# Patient Record
(demographics unavailable — no encounter records)

---

## 2021-05-08 NOTE — EDM.PDOC
ED HPI GENERAL MEDICAL PROBLEM





- General


Stated Complaint: SHOULDER PAIN


Time Seen by Provider: 05/08/21 00:05


Source of Information: Reports: Patient


History Limitations: Reports: No Limitations





- History of Present Illness


INITIAL COMMENTS - FREE TEXT/NARRATIVE: 





c/o L shoulder pain





pain in L shoulder today that is throbbing





has a desk job, worked today





after work had helped his father put a canopy over his boat hoist, had put a 

mower on a trailer and had cinched it down





pain is over L bicep tendon





also has a mass that he noted in his L mid forearm





no known injury





no meds, did take ibuprofen 800 mg 3h PTA





says it is too painful to lift arm


Treatments PTA: Reports: NSAIDS


  ** L shoulder


Pain Score (Numeric/FACES): 8





- Related Data


                                    Allergies











Allergy/AdvReac Type Severity Reaction Status Date / Time


 


No Known Allergies Allergy   Verified 05/08/21 00:25











Home Meds: 


                                    Home Meds





Indomethacin [Indocin] 50 mg PO TID PRN #30 cap 11/03/19 [Rx]


Lisinopril/Hydrochlorothiazide [Lisinopril-Hctz 10-12.5 mg Tab] 1 each PO DAILY 

05/08/21 [History]


allopurinoL [Zyloprim] 100 mg PO DAILY 05/08/21 [History]


amLODIPine [Norvasc] 5 mg PO DAILY 05/08/21 [History]


atorvaSTATin Calcium [Lipitor] 20 mg PO BEDTIME 05/08/21 [History]


metFORMIN [Glucophage] 500 mg PO BIDMEALS 05/08/21 [History]


predniSONE [Prednisone] 20 mg PO DAILY #5 tablet 05/08/21 [Rx]











Past Medical History


Cardiovascular History: Reports: High Cholesterol, Hypertension


Respiratory History: Reports: Sleep Apnea


Other Respiratory History: uses CPAP


Musculoskeletal History: Reports: Fracture, Gout


Other Musculoskeletal History: fx L thumb


Endocrine/Metabolic History: Reports: Diabetes, Type II, Obesity/BMI 30+





- Infectious Disease History


Infectious Disease History: Reports: Chicken Pox





- Past Surgical History


Musculoskeletal Surgical History: Reports: ORIF


Other Musculoskeletal Surgeries/Procedures:: L thumb pinning





Social & Family History





- Family History


Family Medical History: No Pertinent Family History





- Tobacco Use


Tobacco Use Status *Q: Current Every Day Tobacco User


Years of Tobacco use: 10


Packs/Tins Daily: 0.8





- Caffeine Use


Caffeine Use: Reports: Soda





- Recreational Drug Use


Recreational Drug Use: No





ED ROS GENERAL





- Review of Systems


Review Of Systems: See Below


Constitutional: Reports: No Symptoms


HEENT: Reports: No Symptoms


Respiratory: Reports: No Symptoms


Cardiovascular: Reports: No Symptoms


Endocrine: Reports: No Symptoms


GI/Abdominal: Reports: No Symptoms


: Reports: No Symptoms


Musculoskeletal: Reports: Shoulder Pain, Muscle Pain


Skin: Reports: No Symptoms


Neurological: Reports: No Symptoms


Psychiatric: Reports: No Symptoms


Hematologic/Lymphatic: Reports: No Symptoms


Immunologic: Reports: No Symptoms





ED EXAM, GENERAL





- Physical Exam


Exam: See Below


Exam Limited By: No Limitations


General Appearance: Alert, WD/WN, No Apparent Distress


Nose: Normal Inspection


Throat/Mouth: Normal Inspection


Head: Atraumatic


Neck: Normal Inspection, Supple, Non-Tender.  No: Lymphadenopathy (R), 

Lymphadenopathy (L)


Respiratory/Chest: No Respiratory Distress, Lungs Clear, Normal Breath Sounds, 

No Accessory Muscle Use


Cardiovascular: Regular Rate, Rhythm, No Edema, No Murmur


GI/Abdominal: Soft, Non-Tender, No Distention


Back Exam: Normal Inspection, Full Range of Motion


Extremities: Other (takes off sweatshirt with R arm, leaves L arm at site, does 

allow passive ROM to 90 degrees of flex and abduction altho winces with pain, 1+

 tender over biceps tender)


Neurological: Alert, Oriented, CN II-XII Intact, Normal Cognition, No 

Motor/Sensory Deficits, Other (NT over L AC joint, mild tender at anterior L GH 

joint line, L humerus NT, questionable subc density of 10 x 5 x 5 cm over L 

biceps that may be slightly tender, not discrete)


Psychiatric: Normal Affect, Normal Mood


Skin Exam: Warm, Dry, Intact, Normal Color, No Rash


Lymphatic: No Adenopathy





Course





- Vital Signs


Last Recorded V/S: 


                                Last Vital Signs











Temp  36.5 C   05/08/21 00:20


 


Pulse  89   05/08/21 02:18


 


Resp  18   05/08/21 02:18


 


BP  128/88   05/08/21 02:18


 


Pulse Ox  97   05/08/21 02:18














- Orders/Labs/Meds


Orders: 


                               Active Orders 24 hr











 Category Date Time Status


 


 Humerus Lt [CR] Stat Exams  05/08/21 00:15 Taken


 


 Shoulder Comp Lt [CR] Stat Exams  05/08/21 00:15 Taken











Labs: 


                                Laboratory Tests











  05/08/21 05/08/21 05/08/21 Range/Units





  00:25 00:25 00:25 


 


WBC   9.6   (3.2-10.1)  x10-3/uL


 


RBC   5.32   (3.90-5.90)  x10(6)uL


 


Hgb   15.5   (12.9-17.7)  g/dL


 


Hct   45.7   (38.3-50.1)  %


 


MCV   85.8   (80.8-98.7)  fL


 


MCH   29.1   (27.0-33.3)  pg


 


MCHC   33.9   (28.7-35.3)  g/dL


 


RDW   13.0   (12.4-15.0)  %


 


Plt Count   241   (117-477)  x10(3)uL


 


MPV   7.8   (6.7-11.0)  fL


 


Neut % (Auto)   55.0   (40.3-71.8)  %


 


Lymph % (Auto)   29.8   (15.8-45.3)  %


 


Mono % (Auto)   8.9   (5.5-15.2)  %


 


Eos % (Auto)   5.1   (0.1-6.8)  %


 


Baso % (Auto)   1.2   (0.3-3.8)  %


 


Neut # (Auto)   5.3   (1.7-6.9)  x10-3/uL


 


Lymph # (Auto)   2.9   (0.5-4.5)  x10-3/uL


 


Mono # (Auto)   0.9   (0.0-1.2)  x10-3/uL


 


Eos # (Auto)   0.5   (0.0-0.6)  x10-3/uL


 


Baso # (Auto)   0.1   (0.0-0.3)  x10-3/uL


 


Sodium    139  (135-145)  mmol/L


 


Potassium    3.8  (3.5-5.3)  mmol/L


 


Chloride    102  (100-110)  mmol/L


 


Carbon Dioxide    23  (21-32)  mmol/L


 


BUN    19 H  (7-18)  mg/dL


 


Creatinine    1.0  (0.70-1.30)  mg/dL


 


Est Cr Clr Drug Dosing    102.49  mL/min


 


Estimated GFR (MDRD)    > 60  (>60)  


 


BUN/Creatinine Ratio    19.0  (9-20)  


 


Glucose    147 H  ()  mg/dL


 


Uric Acid  6.0    (2.6-6.0)  mg/dL


 


Calcium    8.7  (8.6-10.2)  mg/dL


 


Total Bilirubin    0.4  (0.1-1.3)  mg/dL


 


AST    21  (5-25)  IU/L


 


ALT    49 H  (12-36)  U/L


 


Alkaline Phosphatase    93  ()  IU/L


 


Creatine Kinase    180 H  ()  IU/L


 


C-Reactive Protein     (0.5-0.9)  mg/dL


 


Total Protein    7.2  (6.0-8.0)  g/dL


 


Albumin    3.2 L  (3.5-5.2)  g/dL


 


Globulin    4.0  g/dL


 


Albumin/Globulin Ratio    0.8  


 


Urine Color     (YELLOW)  


 


Urine Appearance     (CLEAR)  


 


Urine pH     (5.0-6.5)  


 


Ur Specific Gravity     (1.010-1.025)  


 


Urine Protein     (NEGATIVE)  mg/dL


 


Urine Glucose (UA)     (NORMAL)  mg/dL


 


Urine Ketones     (NEGATIVE)  mg/dL


 


Urine Occult Blood     (NEGATIVE)  


 


Urine Nitrite     (NEGATIVE)  


 


Urine Bilirubin     (NEGATIVE)  


 


Urine Urobilinogen     (NEGATIVE)  mg/dL


 


Ur Leukocyte Esterase     (NEGATIVE)  


 


Urine RBC     (0-5)  


 


Urine WBC     (0-5)  


 


Ur Squamous Epith Cells     (NS,R,O)  


 


Urine Bacteria     (NS)  


 


Coarse Granular Casts     (NS)  














  05/08/21 05/08/21 Range/Units





  00:25 01:30 


 


WBC    (3.2-10.1)  x10-3/uL


 


RBC    (3.90-5.90)  x10(6)uL


 


Hgb    (12.9-17.7)  g/dL


 


Hct    (38.3-50.1)  %


 


MCV    (80.8-98.7)  fL


 


MCH    (27.0-33.3)  pg


 


MCHC    (28.7-35.3)  g/dL


 


RDW    (12.4-15.0)  %


 


Plt Count    (117-477)  x10(3)uL


 


MPV    (6.7-11.0)  fL


 


Neut % (Auto)    (40.3-71.8)  %


 


Lymph % (Auto)    (15.8-45.3)  %


 


Mono % (Auto)    (5.5-15.2)  %


 


Eos % (Auto)    (0.1-6.8)  %


 


Baso % (Auto)    (0.3-3.8)  %


 


Neut # (Auto)    (1.7-6.9)  x10-3/uL


 


Lymph # (Auto)    (0.5-4.5)  x10-3/uL


 


Mono # (Auto)    (0.0-1.2)  x10-3/uL


 


Eos # (Auto)    (0.0-0.6)  x10-3/uL


 


Baso # (Auto)    (0.0-0.3)  x10-3/uL


 


Sodium    (135-145)  mmol/L


 


Potassium    (3.5-5.3)  mmol/L


 


Chloride    (100-110)  mmol/L


 


Carbon Dioxide    (21-32)  mmol/L


 


BUN    (7-18)  mg/dL


 


Creatinine    (0.70-1.30)  mg/dL


 


Est Cr Clr Drug Dosing    mL/min


 


Estimated GFR (MDRD)    (>60)  


 


BUN/Creatinine Ratio    (9-20)  


 


Glucose    ()  mg/dL


 


Uric Acid    (2.6-6.0)  mg/dL


 


Calcium    (8.6-10.2)  mg/dL


 


Total Bilirubin    (0.1-1.3)  mg/dL


 


AST    (5-25)  IU/L


 


ALT    (12-36)  U/L


 


Alkaline Phosphatase    ()  IU/L


 


Creatine Kinase    ()  IU/L


 


C-Reactive Protein  0.3 L   (0.5-0.9)  mg/dL


 


Total Protein    (6.0-8.0)  g/dL


 


Albumin    (3.5-5.2)  g/dL


 


Globulin    g/dL


 


Albumin/Globulin Ratio    


 


Urine Color   Yellow  (YELLOW)  


 


Urine Appearance   Clear  (CLEAR)  


 


Urine pH   5.0  (5.0-6.5)  


 


Ur Specific Gravity   1.025  (1.010-1.025)  


 


Urine Protein   500 H  (NEGATIVE)  mg/dL


 


Urine Glucose (UA)   Normal  (NORMAL)  mg/dL


 


Urine Ketones   Negative  (NEGATIVE)  mg/dL


 


Urine Occult Blood   Negative  (NEGATIVE)  


 


Urine Nitrite   Negative  (NEGATIVE)  


 


Urine Bilirubin   Negative  (NEGATIVE)  


 


Urine Urobilinogen   Normal  (NEGATIVE)  mg/dL


 


Ur Leukocyte Esterase   Negative  (NEGATIVE)  


 


Urine RBC   0-5  (0-5)  


 


Urine WBC   0-5  (0-5)  


 


Ur Squamous Epith Cells   Occasional  (NS,R,O)  


 


Urine Bacteria   Occasional H  (NS)  


 


Coarse Granular Casts   Occasional H  (NS)  











Meds: 


Medications














Discontinued Medications














Generic Name Dose Route Start Last Admin





  Trade Name [place]  PRN Reason Stop Dose Admin


 


Methylprednisolone Sodium Succinate  125 mg  05/08/21 00:15  05/08/21 00:58





  Methylprednisolone Sodium Succinate 125 Mg/2 Ml Sdv  IM  05/08/21 00:16  125 

mg





  ONETIME ONE   Administration














- Re-Assessments/Exams


Free Text/Narrative Re-Assessment/Exam: 





05/08/21 05:12


tender over biceps tendon





questionable soft tissue density at L biceps





prelim ED read of L shoulder and L humerus films are neg





has inc'd gluc, A1C was inc'd in past, then dec'd, may need to be checked again





unclear if pt has connective tissue disorder, has low alb, u/a shows protein 

which says is old yet not had 24 urine in past, unclear if he may have nephrotic

 syndrome vs CTD





uric acid 7.7 in past, now 6.0





unclear why CK is inc'd





Departure





- Departure


Time of Disposition: 02:28


Disposition: Home, Self-Care 01


Preliminary Cause of Death *Q: Sepsis & Multi System Organ Failure


Clinical Impression: 


 Biceps tendinitis of left shoulder, Elevated CK, Elevated ALT measurement, 

Hypoalbuminemia, Proteinuria, Hyperglycemia








- Discharge Information


*PRESCRIPTION DRUG MONITORING PROGRAM REVIEWED*: Not Applicable


*COPY OF PRESCRIPTION DRUG MONITORING REPORT IN PATIENT JOSEFINA: Not Applicable


Prescriptions: 


predniSONE [Prednisone] 20 mg PO DAILY #5 tablet


Instructions:  Bicipital Tendinitis


Referrals: 


Reyna Velarde NP [Primary Care Provider] - 


Forms:  ED Department Discharge


Additional Instructions: 


For pain and inflammation, take ibuprofen 200 mg 4 tabs and acetaminophen 500 mg

2 tabs 3 times a day for one week.





For pain and inflammation, take prednisone 20 mg 1 tab daily for 5 days.





For pain and inflammation, use ice for 10 minutes 4 times a day for 2 days, 

longer if needed.





Limit use of the left arm for the next several days while it is healing.





Collect your urine for 24 hours to measure the total amount of protein and to 

test for the type of protein.





Your glucose is running higher again, which you will want to discuss with your 

physician.





Two of your muscle enzymes are running on the high side for unclear reason, 

which you will want to discuss with your physician as well.





Return to the Emergency Department if you are feeling worse.





Sepsis Event Note (ED)





- Evaluation


Sepsis Screening Result: No Definite Risk





- Focused Exam


Vital Signs: 


                                   Vital Signs











  Temp Pulse Resp BP Pulse Ox


 


 05/08/21 02:18   89  18  128/88  97


 


 05/08/21 00:20  36.5 C  94  18  157/100 H  96














- My Orders


Last 24 Hours: 


My Active Orders





05/08/21 00:15


Humerus Lt [CR] Stat 


Shoulder Comp Lt [CR] Stat 














- Assessment/Plan


Last 24 Hours: 


My Active Orders





05/08/21 00:15


Humerus Lt [CR] Stat 


Shoulder Comp Lt [CR] Stat

## 2021-05-10 NOTE — CR
INDICATION:  Pain, left shoulder x1 day.  No trauma history.  



LEFT SHOULDER:  Three views of the left shoulder were obtained 05/08/21 - no 
comparison.  



There are some mild degenerative changes at the AC joint with no other 
significant bone or joint abnormality identified.  

MTDD

## 2021-05-10 NOTE — CR
INDICATION:  Possible subcutaneous mass at mid biceps.  



LEFT HUMERUS:  Three views of the left humerus were obtained 05/08/21 - no 
comparisons.  



No definite soft tissue mass could be identified - correlate clinically.  



No significant appearing bone or joint abnormality was identified.  



A tiny calcific or possibly very tiny bony density is noted medial to the 
proximal ulnar metaphysis at the edge of the medial elbow joint compartment and 
may represent a dystropic soft tissue calcification from previous injury, an old
chip fracture fragment that did not unite is felt to be less likely.  



IMPRESSION;  No acute abnormality identified.  Findings should be correlated 
clinically.  





MTDD